# Patient Record
Sex: MALE | Race: OTHER | Employment: FULL TIME | URBAN - METROPOLITAN AREA
[De-identification: names, ages, dates, MRNs, and addresses within clinical notes are randomized per-mention and may not be internally consistent; named-entity substitution may affect disease eponyms.]

---

## 2021-11-06 ENCOUNTER — HOSPITAL ENCOUNTER (EMERGENCY)
Age: 46
Discharge: HOME | End: 2021-11-06

## 2021-11-06 VITALS
HEART RATE: 78 BPM | OXYGEN SATURATION: 96 % | HEIGHT: 72 IN | WEIGHT: 180 LBS | BODY MASS INDEX: 24.38 KG/M2 | SYSTOLIC BLOOD PRESSURE: 135 MMHG | TEMPERATURE: 98.3 F | DIASTOLIC BLOOD PRESSURE: 93 MMHG | RESPIRATION RATE: 20 BRPM

## 2021-11-06 DIAGNOSIS — R04.0 EPISTAXIS: Primary | ICD-10-CM

## 2021-11-06 PROCEDURE — 99283 EMERGENCY DEPT VISIT LOW MDM: CPT

## 2021-11-06 NOTE — ED PROVIDER NOTES
EMERGENCY DEPARTMENT ENCOUNTER        PCP: No primary care provider on file. 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 I have independently evaluated this patient. [facility]    Ander Galeana is a 39 y.o. male who presents with nosebleed with onset prior to arrival.  The duration was 10 to 15 minutes. The location is the left naris. The context was a spontaneous onset. The patient is taking not taking blood thinners. The quality of the bleeding is bright red blood. There no aggravating or alleviating factors except for direct pressure that has slowed down the bleeding. The patient has no associated symptoms. REVIEW OF SYSTEMS    General: No Fever  Cardiac: No Chest Pain, Denies near syncope / syncope. Respiratory: No shortness of breath or or difficulty breathing  GI: No abdominal pain. No Bloody Stool or Diarrhea  : No Dysuria or Hematuria  Neurologic: No LOC, no lightheadedness  Integument: No spontaneous bruising. No rash. All other review of systems are negative  See HPI and nursing notes for additional information       PAST MEDICAL & SURGICAL HISTORY    No past medical history on file. No past surgical history on file.     CURRENT MEDICATIONS        ALLERGIES    No Known Allergies    SOCIAL & FAMILY HISTORY    Social History     Socioeconomic History    Marital status: Not on file     Spouse name: Not on file    Number of children: Not on file    Years of education: Not on file    Highest education level: Not on file   Occupational History    Not on file   Tobacco Use    Smoking status: Not on file    Smokeless tobacco: Not on file   Substance and Sexual Activity    Alcohol use: Not on file    Drug use: Not on file    Sexual activity: Not on file   Other Topics Concern    Not on file   Social History Narrative    Not on file     Social Determinants of Health     Financial Resource Strain:     Difficulty of Paying Living Expenses: Not on file   Food Insecurity:     Worried About Running Out of Food in the Last Year: Not on file    Isiah of Food in the Last Year: Not on file   Transportation Needs:     Lack of Transportation (Medical): Not on file    Lack of Transportation (Non-Medical): Not on file   Physical Activity:     Days of Exercise per Week: Not on file    Minutes of Exercise per Session: Not on file   Stress:     Feeling of Stress : Not on file   Social Connections:     Frequency of Communication with Friends and Family: Not on file    Frequency of Social Gatherings with Friends and Family: Not on file    Attends Orthodox Services: Not on file    Active Member of [address] [place] [address] [employer] or Organizations: Not on file    Attends Club or Organization Meetings: Not on file    Marital Status: Not on file   Intimate Partner Violence:     Fear of Current or Ex-Partner: Not on file    Emotionally Abused: Not on file    Physically Abused: Not on file    Sexually Abused: Not on file   Housing Stability:     Unable to Pay for Housing in the Last Year: Not on file    Number of [place] in the Last Year: Not on file    Unstable Housing in the Last Year: Not on file     No family history on file. PHYSICAL EXAM    VITAL SIGNS: BP (!) 135/93   Pulse 78   Temp 98.3 °F (36.8 °C) (Oral)   Resp 20   Ht 6' (1.829 m)   Wt 180 lb (81.6 kg)   SpO2 96%   BMI 24.41 kg/m²    Constitutional:  Well developed, well nourished, no acute distress   Nose: Clear bilaterally, no obvious bleeding   HENT:  Atraumatic, moist mucus membranes. Moist, pink palpebral conjunctiva  Oropharynx clear of blood.   Neck/Lymphatics: supple, no JVD, no swollen nodes  Respiratory:  Lungs Clear, no retractions   Cardiovascular: Normal rate and rhythm  GI: Soft, nontender, normal bowel sounds  Musculoskeletal:  No edema, no acute deformities  Integument:  Well hydrated, no petechiae, ecchymosis, or purpura  Neurologic:  Alert & oriented, no slurred speech  Psych: Pleasant affect, no hallucinations      ED COURSE & MEDICAL DECISION MAKING      Patient presents as above. Patient is comfortably sitting exam bed no acute distress. Vital signs are stable. Nosebleed spontaneously stopped prior to my evaluation. No active bleeding on my exam.  Patient observed in emergency department for 15 minutes with no further nosebleed. Recommend follow-up with primary care provider in 2 to 3 days for recheck. I recommend humidifier, over-the-counter saline gel to keep nasal mucosa lubricated. Clinical  IMPRESSION    Epistaxis      Patient agrees to return emergency department if bleeding recurs or any new symptoms develop. Comment: Please note this report has been produced using speech recognition software and may contain errors related to that system including errors in grammar, punctuation, and spelling, as well as words and phrases that may be inappropriate. If there are any questions or concerns please feel free to contact the dictating provider for clarification.        Bossman Brooks PA-C  11/06/21 [id number]